# Patient Record
Sex: MALE | Race: WHITE | NOT HISPANIC OR LATINO | Employment: FULL TIME | URBAN - METROPOLITAN AREA
[De-identification: names, ages, dates, MRNs, and addresses within clinical notes are randomized per-mention and may not be internally consistent; named-entity substitution may affect disease eponyms.]

---

## 2020-06-29 ENCOUNTER — OFFICE VISIT (OUTPATIENT)
Dept: ORTHOPEDICS | Facility: CLINIC | Age: 45
End: 2020-06-29
Payer: COMMERCIAL

## 2020-06-29 VITALS
DIASTOLIC BLOOD PRESSURE: 85 MMHG | SYSTOLIC BLOOD PRESSURE: 126 MMHG | BODY MASS INDEX: 27.92 KG/M2 | WEIGHT: 195 LBS | HEART RATE: 93 BPM | HEIGHT: 70 IN

## 2020-06-29 DIAGNOSIS — M79.662 BILATERAL CALF PAIN: Primary | ICD-10-CM

## 2020-06-29 DIAGNOSIS — R26.9 GAIT ABNORMALITY: ICD-10-CM

## 2020-06-29 DIAGNOSIS — S96.911A: ICD-10-CM

## 2020-06-29 DIAGNOSIS — M79.661 PAIN OF RIGHT LOWER LEG: ICD-10-CM

## 2020-06-29 DIAGNOSIS — M79.661 BILATERAL CALF PAIN: Primary | ICD-10-CM

## 2020-06-29 PROCEDURE — 3008F BODY MASS INDEX DOCD: CPT | Mod: CPTII,S$GLB,, | Performed by: PHYSICAL MEDICINE & REHABILITATION

## 2020-06-29 PROCEDURE — 99999 PR PBB SHADOW E&M-NEW PATIENT-LVL III: CPT | Mod: PBBFAC,,, | Performed by: PHYSICAL MEDICINE & REHABILITATION

## 2020-06-29 PROCEDURE — 97110 THERAPEUTIC EXERCISES: CPT | Mod: S$GLB,,, | Performed by: PHYSICAL MEDICINE & REHABILITATION

## 2020-06-29 PROCEDURE — 97110 PR THERAPEUTIC EXERCISES: ICD-10-PCS | Mod: S$GLB,,, | Performed by: PHYSICAL MEDICINE & REHABILITATION

## 2020-06-29 PROCEDURE — 3008F PR BODY MASS INDEX (BMI) DOCUMENTED: ICD-10-PCS | Mod: CPTII,S$GLB,, | Performed by: PHYSICAL MEDICINE & REHABILITATION

## 2020-06-29 PROCEDURE — 99204 OFFICE O/P NEW MOD 45 MIN: CPT | Mod: S$GLB,,, | Performed by: PHYSICAL MEDICINE & REHABILITATION

## 2020-06-29 PROCEDURE — 99204 PR OFFICE/OUTPT VISIT, NEW, LEVL IV, 45-59 MIN: ICD-10-PCS | Mod: S$GLB,,, | Performed by: PHYSICAL MEDICINE & REHABILITATION

## 2020-06-29 PROCEDURE — 99999 PR PBB SHADOW E&M-NEW PATIENT-LVL III: ICD-10-PCS | Mod: PBBFAC,,, | Performed by: PHYSICAL MEDICINE & REHABILITATION

## 2020-06-29 NOTE — PROGRESS NOTES
SPORTS MEDICINE / PM&R NEW PATIENT H & P:    Referring Physician: Self, Aaareferral    Chief Complaint   Patient presents with    Right Lower Leg - Pain       HPI: This is a 44 y.o.  male being seen in clinic today for evaluation of Pain of the Right Lower Leg   The problem first began about 3-4 years ago. Seemed to just strain it one day, and has been a recurring problem ever since. Seems to happen more frequently now.  Patient is a runner. He feels sharp, aching, and intermittent pain in his RIGHT calf. The symptoms show no change. He has tried ice and heat without improvement. He has tried physical therapy and dry needling.     History obtained from patient.    Past family, medical, social, surgical history, and vital signs reviewed in chart.    Review of Systems   Constitutional: Negative for chills, fever and weight loss.   HENT: Negative for hearing loss and sore throat.    Eyes: Negative for blurred vision, photophobia and pain.   Respiratory: Negative for shortness of breath.    Cardiovascular: Negative for chest pain.   Gastrointestinal: Negative for abdominal pain.   Genitourinary: Negative for dysuria.   Skin: Negative for rash.   Neurological: Negative for tingling and headaches.   Endo/Heme/Allergies: Does not bruise/bleed easily.   Psychiatric/Behavioral: Negative for depression.       General    Nursing note and vitals reviewed.  Constitutional: He is oriented to person, place, and time. He appears well-developed and well-nourished.   HENT:   Head: Normocephalic and atraumatic.   Eyes: Conjunctivae and EOM are normal. Pupils are equal, round, and reactive to light.   Neck: Neck supple.   Cardiovascular: Intact distal pulses.    Pulmonary/Chest: Effort normal. No respiratory distress.   Abdominal: He exhibits no distension.   Neurological: He is alert and oriented to person, place, and time. He has normal reflexes.   Psychiatric: He has a normal mood and affect.     General Musculoskeletal Exam    Gait: normal     Right Ankle/Foot Exam   Right ankle exam is normal.    Inspection   Deformity: absent  Effusion: Ankle - absent     Range of Motion   Ankle Joint   Dorsiflexion: normal   Plantar flexion: normal   Subtalar Joint   Inversion: normal   Eversion: normal   First MTP Joint: normal    Alignment   Hindfoot Alignment: neutral  Forefoot Alignment: normal    Muscle Strength   The patient has normal right ankle strength.    Tests   Anterior drawer: negative  Varus tilt: negative  Heel Walk: able to perform  Tiptoe Walk: able to perform  Squeeze Test: negative    Other   Sensation: normal    Comments:  Does poorly with toe yoga. Does well with single leg balance, much less well with eyes closed.   Single Leg Squat Test:  R: loss of first ray contact and hands off hips  L: knee adduction, trunk lean, loss of first ray contact and foot touching down    He has palpable knots in lateral calf near calf soleus junction.    Running gait observed, shows slow andria, good posture, limited hip extension, minimal evidence of lateral instability.      Left Ankle/Foot Exam   Left ankle exam is normal.    Inspection  Deformity: absent  Effusion: Ankle - absent     Range of Motion   Ankle Joint  Dorsiflexion: normal   Plantar flexion: normal     Subtalar Joint   Inversion: normal   Eversion: normal   First MTP Joint: normal    Alignment   Hindfoot Alignment: neutral  Forefoot Alignment: normal    Muscle Strength   The patient has normal left ankle strength.    Tests   Anterior drawer: negative  Varus tilt: negative  Heel Walk: able to perform  Tiptoe Walk: able to perform  Squeeze Test: absent    Other   Sensation: normal    Comments:  Does poorly with toe yoga.      Vascular Exam     Right Pulses  Dorsalis Pedis:      2+          Left Pulses  Dorsalis Pedis:      2+              IMPRESSION/PLAN: Nando is a 44 y.o.  male with:    1. Bilateral calf pain    2. Muscle strain of ankle, right, initial encounter    3. Gait  abnormality    4. Pain of right lower leg  - MRI Tibia Fibula Without Contrast Right; Future  - MRI Tibia Fibula Without Contrast Right       The findings were discussed with Nando in detail. Given that this has been going on for 3 years and keeps recurring, we need to work this up more. We'll get MRI of the leg to eval for stress fracture, chronic muscle strain, etc. While waiting on that, he will begin working on the exercises below for rehab purposes. At least 15 minutes were spent teaching the patient a therapeutic home exercise program and they were provided this plan in writing.    All of his questions were answered. He was provided this plan in writing. He will follow-up with me in 6 weeks.       Activity Modification Guidelines    1. Ok to Run/Play/Exercise with mild pain, no more than 3 out of 10 on pain scale, but need to stop activity if pain is moderate or 4 out of 10 and above.    2. Caveat - if pain with activity starts at worse than 3/10, but decreases within a few minutes, it's OK to push through.     3. No Running/Lifting if limping/changing gait/changing lifting form.    4. Advance mileage/weights by no more than 10% per week. Long run mileage shouldn't be more than about 30% total weekly mileage.       Toe Yoga - The purpose is to give you a smarter, stronger, and more stable foot. The big toe accounts for about 85% of our stability, get it stronger! Start with coordination:  1. Keeping little toes down, lift the big toe.  2. Put big toe down, lift the small toes.  3. Alternate for 20 - 30 seconds before taking a break.  4. Progress from doing this sitting to standing to single leg stance.        For Foot Strength:  1. Pick the little toes up, drive the big toe down without curling it.  2. Gradually hold this squeezing for longer and longer.  3. Again progress from sitting to standing to single leg stance.  4. Start incorporating this with standing exercises like squats and deadlifts and all  single leg work.     For a good primer, check out this link - Are You Ready to go Minimal: https://www.youtube.com/watch?v=YtICeFOKjIs    Or: https://youtu.be/553Ma221eSz    And: https://youtu.be/[email or web address]      Balance Issues - Balance and Proprioception (ability to feel where the body is in space) is important to keep us upright and keep our joints in good position. The foot has to be able to micro-corrections within a few hundredths of a second while running, otherwise problems will show up somewhere. Fortunately, improving balance is one of the quickest things we can do as it is more about neurological control rather than strength. Toe yoga will help keep the great toe on the ground which will give you better control. Good posture keeps weight on the forefoot so you can control side to side forces better. Standing on one leg multiple times throughout the day will improve balance quickly and even develop some endurance in leg and hip muscles.    To improve, practice many times a day. Find good posture, drive the toe down to stabilize the arch, brace your core, and stand on one leg for 20 - 30 seconds, then the other. Advance from quiet standing, to gentle side to side rotation, to quiet standing with eyes closed, to rotation with eyes closed.         Mobilization:     Its generally a good idea to assess yourself for hotspots once a month by briefly mobilizing everything with a  or foam roller, and then spend several minutes every other day of the week, for 4 - 6 weeks working on those painful spots using thumbs, foam roll, rolling pins, lacrosse ball, etc. If it hurts, then you need to do it. Once it no longer hurts, take a break on that area for a few months.     Tibialis Posterior Mobilization: Work your thumbs along the muscles just behind the shin bone. When you find a trigger point, apply pressure until about 4/10 pain, then go through full range of flexion & extension at the ankle. This will  "bring you up to about 6/10 pain. Wiggle the ankle for about 20 seconds, then look for a new spot. The goal is to be somewhere between pleasant and agonizing with the pressure! Do this for 6 weeks or until you can't find hotspots.     Picture credit - "Anatomy for Runners" by CARLOS Todd    Working through these progressions:    Unless otherwise stated, you should only be doing one exercise from each progression listed below. These are listed in order of difficulty, so start with the first one in each list. Do as many reps as you can while maintaining excellent form. Stop doing the exercise if you fatigue or can't maintain proper form. Once you can do the recommended amount of reps for that exercise, stop doing that one and move on to the next exercise in that progression list for your next workout. Try for 2 - 3 workouts per week.    Bridge Progression: The purpose is to practice pushing the leg back using the glutes while maintaining a neutral spine. Start with your foot close enough to touch the heel. Brace your core without letting your back arch or flatten, or rotate for single leg exercises. Squeeze the glutes and drive down through the heel. If you feel tightness in the low back, you need to either brace the core more, use the glutes more, or don't lift the hips quite as high. Arms down is easier, arms up is harder.     1. Double leg bridge with arms up: 3 x 10 - 20        2. Bridge march with arms up: 3 x 20 Don't allow the hips to rotate.        3. Single leg bridge arms up: 3 x 10 - 15 each leg         Hip Abduction Progression:      Lateral control - This is one of the most common problems with runners and can contribute to many different injuries and wasted energy. Improving hip strength will help significantly. As your hips get stronger, think about actively squeezing your glutes when you run - that will help get these muscles firing. Do only 1 of these exercises per workout and advance to the next " exercise once you can do the recommended amount of reps.     1. Clam shells: 3 x 30  Stay perpendicular to ground, knees bent 90 degrees, feet lined up even with low back. Brace core & squeeze glutes throughout the exercise. Lift top knee only as far as you can without rolling backwards.           2. Side lying leg lift (keep leg slightly extended): 3 x 15      3. Seven way hips: Start with 3 x 2 - 3 reps and progress to 3 x 8 - 10 reps. Follow this link:  https://www.[facility].com/watch?v=YdenOdoz-MI      4. Standing hip hikes: 3 x 20  (bonus points: hold core tight, good posture with weight on forefoot, and toe yoga)        Lower Leg Progression:    1. Eccentric calf raises: 3 x 15 - Up quickly with both feet, lift one foot up and then take 3 - 5 seconds to lower yourself with other leg. Do all reps on one side, then the other. Best done with heels hanging over edge of a step unless you have insertional Achilles tendonitis, then you should stay on flat ground.        Balance Progression: Links to these exercises on the [employer] board site, but each can be done without a board.    1. Single leg kettlebell press: 3 x 10 - 15  [email or web address]/[email or web address]      2. Single Leg Kettleball Swap: 3 x 30 seconds:  [email or web address]    3. Star Squats: 3 x 30 seconds each leg: [email or web address]      Intermediate Glute Max Progression:     With these exercises:  - continue to focus on abdominal bracing  - think about using your foot muscles (toe yoga) to prevent the foot from collapsing in  - keep the middle of the kneecap lined up over the second toe  - good control is more important than the speed of the exercise or how many you do!  - OK to do more than one of these at a time, if your glutes can handle it.    1. Chair of Death squats: 3 x 15   Drive hips back, keeping tibia as vertical as possible. The bar Im holding is to remind me not to flex my spine on the way down, or arch my back on the way up. Think  ""sit back" rather than "squat down".        2. Lunges: 3 x 10 Keep torso upright and dont let knee go past toes. Do multi-directional lunges for bonus points.      3. Hip Hinges: 3 x 15 Use a braced core to keep the spine in a neutral position. A stick can help you to know if spine is staying in right position. Think hips back, hips forward. Ok to bend the knees slightly to take stretch off of the hamstrings.       4. Single leg deadlifts: 3 x 10    Remember to keep spine straight as well as the leg you are kicking back. Only once you are sure youre using good form should you drop the ryanne and pick-up a dumbbell or barbell.         Stretching for Range of Motion:    Remember not to do static stretching before running or working out. Do something to warm up instead. Its best to stretch after runs or workouts, or any time on rest days. Stretch at least 4 days per week, 1 - 2 minutes per stretch. It will take 10 - 12 weeks to get the full benefit.     Hip flexors: Use whichever stretch you prefer, the kneeling or lunging type, or switch them up to keep it exciting.         Rotate pelvis backwards and flatten the low back     Hold this position for several minutes.                 Frances Rahman M.D.  Sports Medicine  "

## 2020-06-29 NOTE — PATIENT INSTRUCTIONS
Activity Modification Guidelines    1. Ok to Run/Play/Exercise with mild pain, no more than 3 out of 10 on pain scale, but need to stop activity if pain is moderate or 4 out of 10 and above.    2. Caveat - if pain with activity starts at worse than 3/10, but decreases within a few minutes, it's OK to push through.     3. No Running/Lifting if limping/changing gait/changing lifting form.    4. Advance mileage/weights by no more than 10% per week. Long run mileage shouldn't be more than about 30% total weekly mileage.       Toe Yoga - The purpose is to give you a smarter, stronger, and more stable foot. The big toe accounts for about 85% of our stability, get it stronger! Start with coordination:  1. Keeping little toes down, lift the big toe.  2. Put big toe down, lift the small toes.  3. Alternate for 20 - 30 seconds before taking a break.  4. Progress from doing this sitting to standing to single leg stance.        For Foot Strength:  1. Pick the little toes up, drive the big toe down without curling it.  2. Gradually hold this squeezing for longer and longer.  3. Again progress from sitting to standing to single leg stance.  4. Start incorporating this with standing exercises like squats and deadlifts and all single leg work.     For a good primer, check out this link - Are You Ready to go Minimal: https://www.youTabletize.comube.com/watch?v=YtICeFOKjIs    Or: https://youtu.be/788Vr043aSk    And: https://youtu.be/bGdOFnRwleA      Balance Issues - Balance and Proprioception (ability to feel where the body is in space) is important to keep us upright and keep our joints in good position. The foot has to be able to micro-corrections within a few hundredths of a second while running, otherwise problems will show up somewhere. Fortunately, improving balance is one of the quickest things we can do as it is more about neurological control rather than strength. Toe yoga will help keep the great toe on the ground which will give you  "better control. Good posture keeps weight on the forefoot so you can control side to side forces better. Standing on one leg multiple times throughout the day will improve balance quickly and even develop some endurance in leg and hip muscles.    To improve, practice many times a day. Find good posture, drive the toe down to stabilize the arch, brace your core, and stand on one leg for 20 - 30 seconds, then the other. Advance from quiet standing, to gentle side to side rotation, to quiet standing with eyes closed, to rotation with eyes closed.         Mobilization:     Its generally a good idea to assess yourself for hotspots once a month by briefly mobilizing everything with a  or foam roller, and then spend several minutes every other day of the week, for 4 - 6 weeks working on those painful spots using thumbs, foam roll, rolling pins, lacrosse ball, etc. If it hurts, then you need to do it. Once it no longer hurts, take a break on that area for a few months.     Tibialis Posterior Mobilization: Work your thumbs along the muscles just behind the shin bone. When you find a trigger point, apply pressure until about 4/10 pain, then go through full range of flexion & extension at the ankle. This will bring you up to about 6/10 pain. Wiggle the ankle for about 20 seconds, then look for a new spot. The goal is to be somewhere between pleasant and agonizing with the pressure! Do this for 6 weeks or until you can't find hotspots.     Picture credit - "Anatomy for Runners" by CARLOS Todd    Working through these progressions:    Unless otherwise stated, you should only be doing one exercise from each progression listed below. These are listed in order of difficulty, so start with the first one in each list. Do as many reps as you can while maintaining excellent form. Stop doing the exercise if you fatigue or can't maintain proper form. Once you can do the recommended amount of reps for that exercise, stop doing " that one and move on to the next exercise in that progression list for your next workout. Try for 2 - 3 workouts per week.    Bridge Progression: The purpose is to practice pushing the leg back using the glutes while maintaining a neutral spine. Start with your foot close enough to touch the heel. Brace your core without letting your back arch or flatten, or rotate for single leg exercises. Squeeze the glutes and drive down through the heel. If you feel tightness in the low back, you need to either brace the core more, use the glutes more, or don't lift the hips quite as high. Arms down is easier, arms up is harder.     1. Double leg bridge with arms up: 3 x 10 - 20        2. Bridge march with arms up: 3 x 20 Don't allow the hips to rotate.        3. Single leg bridge arms up: 3 x 10 - 15 each leg         Hip Abduction Progression:      Lateral control - This is one of the most common problems with runners and can contribute to many different injuries and wasted energy. Improving hip strength will help significantly. As your hips get stronger, think about actively squeezing your glutes when you run - that will help get these muscles firing. Do only 1 of these exercises per workout and advance to the next exercise once you can do the recommended amount of reps.     1. Clam shells: 3 x 30  Stay perpendicular to ground, knees bent 90 degrees, feet lined up even with low back. Brace core & squeeze glutes throughout the exercise. Lift top knee only as far as you can without rolling backwards.           2. Side lying leg lift (keep leg slightly extended): 3 x 15      3. Seven way hips: Start with 3 x 2 - 3 reps and progress to 3 x 8 - 10 reps. Follow this link:  https://www.[employer].com/watch?v=YdenOdoz-MI      4. Standing hip hikes: 3 x 20  (bonus points: hold core tight, good posture with weight on forefoot, and toe yoga)        Lower Leg Progression:    1. Eccentric calf raises: 3 x 15 - Up quickly with both feet, lift  "one foot up and then take 3 - 5 seconds to lower yourself with other leg. Do all reps on one side, then the other. Best done with heels hanging over edge of a step unless you have insertional Achilles tendonitis, then you should stay on flat ground.        Balance Progression: Links to these exercises on the [employer] board site, but each can be done without a board.    1. Single leg kettlebell press: 3 x 10 - 15  [email or web address]-[email or web address]      2. Single Leg Kettleball Swap: 3 x 30 seconds:  [email or web address]    3. Star Squats: 3 x 30 seconds each leg: [email or web address]      Intermediate Glute Max Progression:     With these exercises:  - continue to focus on abdominal bracing  - think about using your foot muscles (toe yoga) to prevent the foot from collapsing in  - keep the middle of the kneecap lined up over the second toe  - good control is more important than the speed of the exercise or how many you do!  - OK to do more than one of these at a time, if your glutes can handle it.    1. Chair of Death squats: 3 x 15   Drive hips back, keeping tibia as vertical as possible. The bar Im holding is to remind me not to flex my spine on the way down, or arch my back on the way up. Think "sit back" rather than "squat down".        2. Lunges: 3 x 10 Keep torso upright and dont let knee go past toes. Do multi-directional lunges for bonus points.      3. Hip Hinges: 3 x 15 Use a braced core to keep the spine in a neutral position. A stick can help you to know if spine is staying in right position. Think hips back, hips forward. Ok to bend the knees slightly to take stretch off of the hamstrings.       4. Single leg deadlifts: 3 x 10    Remember to keep spine straight as well as the leg you are kicking back. Only once you are sure youre using good form should you drop the ryanne and pick-up a dumbbell or barbell.         Stretching for Range of Motion:    Remember not to do static stretching before " running or working out. Do something to warm up instead. Its best to stretch after runs or workouts, or any time on rest days. Stretch at least 4 days per week, 1 - 2 minutes per stretch. It will take 10 - 12 weeks to get the full benefit.     Hip flexors: Use whichever stretch you prefer, the kneeling or lunging type, or switch them up to keep it exciting.         Rotate pelvis backwards and flatten the low back     Hold this position for several minutes.